# Patient Record
Sex: MALE | Race: WHITE | Employment: FULL TIME | URBAN - NONMETROPOLITAN AREA
[De-identification: names, ages, dates, MRNs, and addresses within clinical notes are randomized per-mention and may not be internally consistent; named-entity substitution may affect disease eponyms.]

---

## 2018-10-09 ENCOUNTER — HOSPITAL ENCOUNTER (OUTPATIENT)
Age: 53
Setting detail: OBSERVATION
Discharge: HOME | End: 2018-10-10
Attending: INTERNAL MEDICINE | Admitting: INTERNAL MEDICINE
Payer: COMMERCIAL

## 2018-10-09 ENCOUNTER — APPOINTMENT (OUTPATIENT)
Dept: GENERAL RADIOLOGY | Age: 53
End: 2018-10-09
Payer: COMMERCIAL

## 2018-10-09 DIAGNOSIS — R07.9 CHEST PAIN, UNSPECIFIED TYPE: ICD-10-CM

## 2018-10-09 DIAGNOSIS — Z72.0 TOBACCO ABUSE: ICD-10-CM

## 2018-10-09 DIAGNOSIS — R03.0 ELEVATED BLOOD PRESSURE READING: ICD-10-CM

## 2018-10-09 DIAGNOSIS — R94.31 EKG ABNORMALITIES: ICD-10-CM

## 2018-10-09 DIAGNOSIS — D69.6 THROMBOCYTOPENIA (HCC): ICD-10-CM

## 2018-10-09 DIAGNOSIS — R42 DIZZINESS: ICD-10-CM

## 2018-10-09 DIAGNOSIS — I20.9 ANGINA PECTORIS (HCC): Primary | ICD-10-CM

## 2018-10-09 PROBLEM — R07.89 OTHER CHEST PAIN: Status: ACTIVE | Noted: 2018-10-09

## 2018-10-09 LAB
ABSOLUTE EOS #: 0.19 K/UL (ref 0–0.4)
ABSOLUTE IMMATURE GRANULOCYTE: ABNORMAL K/UL (ref 0–0.3)
ABSOLUTE LYMPH #: 1.79 K/UL (ref 1–4.8)
ABSOLUTE MONO #: 0.38 K/UL (ref 0–1)
ALBUMIN SERPL-MCNC: 4.6 G/DL (ref 3.5–5.2)
ALBUMIN/GLOBULIN RATIO: ABNORMAL (ref 1–2.5)
ALP BLD-CCNC: 92 U/L (ref 40–129)
ALT SERPL-CCNC: 22 U/L (ref 5–41)
ANION GAP SERPL CALCULATED.3IONS-SCNC: 10 MMOL/L (ref 9–17)
AST SERPL-CCNC: 20 U/L
BASOPHILS # BLD: 1 % (ref 0–2)
BASOPHILS ABSOLUTE: 0.06 K/UL (ref 0–0.2)
BILIRUB SERPL-MCNC: 0.47 MG/DL (ref 0.3–1.2)
BNP INTERPRETATION: NORMAL
BUN BLDV-MCNC: 15 MG/DL (ref 6–20)
BUN/CREAT BLD: 17 (ref 9–20)
CALCIUM SERPL-MCNC: 9.5 MG/DL (ref 8.6–10.4)
CHLORIDE BLD-SCNC: 101 MMOL/L (ref 98–107)
CO2: 25 MMOL/L (ref 20–31)
CREAT SERPL-MCNC: 0.9 MG/DL (ref 0.7–1.2)
DIFFERENTIAL TYPE: ABNORMAL
EOSINOPHILS RELATIVE PERCENT: 3 % (ref 0–5)
GFR AFRICAN AMERICAN: >60 ML/MIN
GFR NON-AFRICAN AMERICAN: >60 ML/MIN
GFR SERPL CREATININE-BSD FRML MDRD: ABNORMAL ML/MIN/{1.73_M2}
GFR SERPL CREATININE-BSD FRML MDRD: ABNORMAL ML/MIN/{1.73_M2}
GLUCOSE BLD-MCNC: 105 MG/DL (ref 70–99)
HCT VFR BLD CALC: 41.3 % (ref 41–53)
HEMOGLOBIN: 14.7 G/DL (ref 13.5–17.5)
IMMATURE GRANULOCYTES: ABNORMAL %
LYMPHOCYTES # BLD: 28 % (ref 13–44)
MCH RBC QN AUTO: 31.1 PG (ref 26–34)
MCHC RBC AUTO-ENTMCNC: 35.6 G/DL (ref 31–37)
MCV RBC AUTO: 87.5 FL (ref 80–100)
MONOCYTES # BLD: 6 % (ref 5–9)
MORPHOLOGY: ABNORMAL
NRBC AUTOMATED: ABNORMAL PER 100 WBC
PDW BLD-RTO: 12.2 % (ref 12.1–15.2)
PLATELET # BLD: 92 K/UL (ref 140–450)
PLATELET ESTIMATE: ABNORMAL
PMV BLD AUTO: ABNORMAL FL
POTASSIUM SERPL-SCNC: 4 MMOL/L (ref 3.7–5.3)
PRO-BNP: <20 PG/ML
RBC # BLD: 4.72 M/UL (ref 4.5–5.9)
RBC # BLD: ABNORMAL 10*6/UL
SEG NEUTROPHILS: 62 % (ref 39–75)
SEGMENTED NEUTROPHILS ABSOLUTE COUNT: 3.98 K/UL (ref 2.1–6.5)
SODIUM BLD-SCNC: 136 MMOL/L (ref 135–144)
TOTAL PROTEIN: 7.5 G/DL (ref 6.4–8.3)
TROPONIN INTERP: NORMAL
TROPONIN T: <0.03 NG/ML
WBC # BLD: 6.4 K/UL (ref 3.5–11)
WBC # BLD: ABNORMAL 10*3/UL

## 2018-10-09 PROCEDURE — 83880 ASSAY OF NATRIURETIC PEPTIDE: CPT

## 2018-10-09 PROCEDURE — 93005 ELECTROCARDIOGRAM TRACING: CPT

## 2018-10-09 PROCEDURE — 2580000003 HC RX 258: Performed by: INTERNAL MEDICINE

## 2018-10-09 PROCEDURE — 6370000000 HC RX 637 (ALT 250 FOR IP): Performed by: INTERNAL MEDICINE

## 2018-10-09 PROCEDURE — 6360000002 HC RX W HCPCS: Performed by: INTERNAL MEDICINE

## 2018-10-09 PROCEDURE — 71045 X-RAY EXAM CHEST 1 VIEW: CPT

## 2018-10-09 PROCEDURE — G0378 HOSPITAL OBSERVATION PER HR: HCPCS

## 2018-10-09 PROCEDURE — 80053 COMPREHEN METABOLIC PANEL: CPT

## 2018-10-09 PROCEDURE — 36415 COLL VENOUS BLD VENIPUNCTURE: CPT

## 2018-10-09 PROCEDURE — 84484 ASSAY OF TROPONIN QUANT: CPT

## 2018-10-09 PROCEDURE — 99285 EMERGENCY DEPT VISIT HI MDM: CPT

## 2018-10-09 PROCEDURE — 99244 OFF/OP CNSLTJ NEW/EST MOD 40: CPT | Performed by: INTERNAL MEDICINE

## 2018-10-09 PROCEDURE — 96374 THER/PROPH/DIAG INJ IV PUSH: CPT

## 2018-10-09 PROCEDURE — 94761 N-INVAS EAR/PLS OXIMETRY MLT: CPT

## 2018-10-09 PROCEDURE — 96372 THER/PROPH/DIAG INJ SC/IM: CPT

## 2018-10-09 PROCEDURE — 85025 COMPLETE CBC W/AUTO DIFF WBC: CPT

## 2018-10-09 RX ORDER — OMEPRAZOLE 20 MG/1
20 CAPSULE, DELAYED RELEASE ORAL DAILY
COMMUNITY

## 2018-10-09 RX ORDER — ACETAMINOPHEN 325 MG/1
650 TABLET ORAL EVERY 4 HOURS PRN
Status: DISCONTINUED | OUTPATIENT
Start: 2018-10-09 | End: 2018-10-10 | Stop reason: HOSPADM

## 2018-10-09 RX ORDER — CITALOPRAM 10 MG/1
10 TABLET ORAL 2 TIMES DAILY
COMMUNITY

## 2018-10-09 RX ORDER — SODIUM CHLORIDE 0.9 % (FLUSH) 0.9 %
10 SYRINGE (ML) INJECTION EVERY 12 HOURS SCHEDULED
Status: DISCONTINUED | OUTPATIENT
Start: 2018-10-09 | End: 2018-10-10 | Stop reason: HOSPADM

## 2018-10-09 RX ORDER — LOSARTAN POTASSIUM 25 MG/1
25 TABLET ORAL 2 TIMES DAILY
COMMUNITY

## 2018-10-09 RX ORDER — ONDANSETRON 2 MG/ML
4 INJECTION INTRAMUSCULAR; INTRAVENOUS ONCE
Status: COMPLETED | OUTPATIENT
Start: 2018-10-09 | End: 2018-10-09

## 2018-10-09 RX ORDER — ASPIRIN 81 MG/1
324 TABLET, CHEWABLE ORAL ONCE
Status: COMPLETED | OUTPATIENT
Start: 2018-10-09 | End: 2018-10-09

## 2018-10-09 RX ORDER — ASPIRIN 81 MG/1
81 TABLET ORAL DAILY
Status: DISCONTINUED | OUTPATIENT
Start: 2018-10-09 | End: 2018-10-10 | Stop reason: HOSPADM

## 2018-10-09 RX ORDER — ATENOLOL 50 MG/1
50 TABLET ORAL 2 TIMES DAILY
Status: DISCONTINUED | OUTPATIENT
Start: 2018-10-09 | End: 2018-10-10 | Stop reason: HOSPADM

## 2018-10-09 RX ORDER — SODIUM CHLORIDE 0.9 % (FLUSH) 0.9 %
10 SYRINGE (ML) INJECTION PRN
Status: DISCONTINUED | OUTPATIENT
Start: 2018-10-09 | End: 2018-10-10 | Stop reason: HOSPADM

## 2018-10-09 RX ORDER — 0.9 % SODIUM CHLORIDE 0.9 %
500 INTRAVENOUS SOLUTION INTRAVENOUS ONCE
Status: COMPLETED | OUTPATIENT
Start: 2018-10-09 | End: 2018-10-09

## 2018-10-09 RX ORDER — PANTOPRAZOLE SODIUM 40 MG/1
40 TABLET, DELAYED RELEASE ORAL
Status: DISCONTINUED | OUTPATIENT
Start: 2018-10-10 | End: 2018-10-10 | Stop reason: HOSPADM

## 2018-10-09 RX ORDER — NITROGLYCERIN 0.4 MG/1
0.4 TABLET SUBLINGUAL EVERY 5 MIN PRN
Status: DISCONTINUED | OUTPATIENT
Start: 2018-10-09 | End: 2018-10-10 | Stop reason: HOSPADM

## 2018-10-09 RX ORDER — ONDANSETRON 2 MG/ML
4 INJECTION INTRAMUSCULAR; INTRAVENOUS EVERY 6 HOURS PRN
Status: DISCONTINUED | OUTPATIENT
Start: 2018-10-09 | End: 2018-10-10 | Stop reason: HOSPADM

## 2018-10-09 RX ORDER — LOSARTAN POTASSIUM 50 MG/1
25 TABLET ORAL 2 TIMES DAILY
Status: DISCONTINUED | OUTPATIENT
Start: 2018-10-09 | End: 2018-10-10 | Stop reason: HOSPADM

## 2018-10-09 RX ORDER — M-VIT,TX,IRON,MINS/CALC/FOLIC 27MG-0.4MG
1 TABLET ORAL DAILY
Status: DISCONTINUED | OUTPATIENT
Start: 2018-10-09 | End: 2018-10-10 | Stop reason: HOSPADM

## 2018-10-09 RX ORDER — CITALOPRAM 20 MG/1
10 TABLET ORAL 2 TIMES DAILY
Status: DISCONTINUED | OUTPATIENT
Start: 2018-10-09 | End: 2018-10-10 | Stop reason: HOSPADM

## 2018-10-09 RX ORDER — M-VIT,TX,IRON,MINS/CALC/FOLIC 27MG-0.4MG
1 TABLET ORAL DAILY
COMMUNITY

## 2018-10-09 RX ORDER — 0.9 % SODIUM CHLORIDE 0.9 %
1000 INTRAVENOUS SOLUTION INTRAVENOUS ONCE
Status: COMPLETED | OUTPATIENT
Start: 2018-10-09 | End: 2018-10-09

## 2018-10-09 RX ORDER — ATENOLOL 50 MG/1
50 TABLET ORAL 2 TIMES DAILY
COMMUNITY

## 2018-10-09 RX ADMIN — Medication 10 ML: at 19:54

## 2018-10-09 RX ADMIN — SODIUM CHLORIDE 500 ML: 9 INJECTION, SOLUTION INTRAVENOUS at 13:57

## 2018-10-09 RX ADMIN — ATENOLOL 50 MG: 50 TABLET ORAL at 19:55

## 2018-10-09 RX ADMIN — LOSARTAN POTASSIUM 25 MG: 50 TABLET ORAL at 19:54

## 2018-10-09 RX ADMIN — SODIUM CHLORIDE 1000 ML: 9 INJECTION, SOLUTION INTRAVENOUS at 17:23

## 2018-10-09 RX ADMIN — ENOXAPARIN SODIUM 40 MG: 40 INJECTION SUBCUTANEOUS at 17:30

## 2018-10-09 RX ADMIN — ONDANSETRON 4 MG: 2 INJECTION INTRAMUSCULAR; INTRAVENOUS at 13:57

## 2018-10-09 RX ADMIN — ASPIRIN 81 MG 324 MG: 81 TABLET ORAL at 13:57

## 2018-10-09 RX ADMIN — MULTIPLE VITAMINS W/ MINERALS TAB 1 TABLET: TAB at 17:30

## 2018-10-09 RX ADMIN — CITALOPRAM HYDROBROMIDE 10 MG: 20 TABLET ORAL at 19:55

## 2018-10-09 NOTE — ED NOTES
Pt states he would like to leave hospital. Is concerned about the bill that he is going to receive. Speaking to physician about current concerns. Pt states he now feels better. Pt states he will sign AMA form if he decides he would like to leave.       Jessica De Leon RN  10/09/18 8813

## 2018-10-09 NOTE — ED PROVIDER NOTES
with cardiologist Dr. Dillon Covarrubias who I had reviewed the EKG and agrees that there is early repolarization in I, II  [MS]    Patient stood up and feels dizzy. He will stay  [MS]   53-69-10-18 the hospitalist for admission. [MS]   7848 I spoke with Dr. Ortiz Gonzales who accepted this patient for observation for chest pain evaluation. [MS]      ED Course User Index  [MS] Natacha Perez MD       CONSULTS:  IP CONSULT TO CARDIOLOGY    PROCEDURES:  Unless otherwise noted below, none     Procedures      Summation    Middle aged male who presented with unstable angina, now chest pain-free. However symptomatic again when he stood up. Admitted to observation for further evaluation and management. Patient Course:   ED Course as of Oct 10 1134   Tue Oct 09, 2018   1430 Patient wants to leave. [MS]   [phone] Call placed to cardiologist for consult  [MS]   1440 I spoke with cardiologist Dr. Dillon Covarrubias who I had reviewed the EKG and agrees that there is early repolarization in I, II  [MS]    Patient stood up and feels dizzy. He will stay  [MS]   53-69-10-18 the hospitalist for admission. [MS]   1827 I spoke with Dr. Ortiz Gonzales who accepted this patient for observation for chest pain evaluation.   [MS]      ED Course User Index  [MS] Natacha Perez MD         ED Medications administered this visit:    Medications   aspirin EC tablet 81 mg (81 mg Oral Given 10/10/18 0953)   atenolol (TENORMIN) tablet 50 mg (50 mg Oral Given 10/10/18 0953)   citalopram (CELEXA) tablet 10 mg (10 mg Oral Given 10/10/18 0953)   losartan (COZAAR) tablet 25 mg (25 mg Oral Given 10/10/18 0953)   therapeutic multivitamin-minerals 1 tablet (1 tablet Oral Given 10/10/18 0953)   pantoprazole (PROTONIX) tablet 40 mg (40 mg Oral Given 10/10/18 0616)   sodium chloride flush 0.9 % injection 10 mL (10 mLs Intravenous Given 10/10/18 0953)   sodium chloride flush 0.9 % injection 10 mL (not administered)   acetaminophen (TYLENOL) tablet 650 mg (not

## 2018-10-10 VITALS
HEIGHT: 69 IN | RESPIRATION RATE: 18 BRPM | BODY MASS INDEX: 34.07 KG/M2 | HEART RATE: 67 BPM | WEIGHT: 230 LBS | TEMPERATURE: 98.4 F | SYSTOLIC BLOOD PRESSURE: 131 MMHG | OXYGEN SATURATION: 96 % | DIASTOLIC BLOOD PRESSURE: 79 MMHG

## 2018-10-10 LAB
CHOLESTEROL/HDL RATIO: 6.7
CHOLESTEROL: 181 MG/DL
HCT VFR BLD CALC: 41.4 % (ref 41–53)
HDLC SERPL-MCNC: 27 MG/DL
HEMOGLOBIN: 14.1 G/DL (ref 13.5–17.5)
LDL CHOLESTEROL: 100 MG/DL (ref 0–130)
LV EF: 60 %
LVEF MODALITY: NORMAL
MCH RBC QN AUTO: 30.9 PG (ref 26–34)
MCHC RBC AUTO-ENTMCNC: 34.1 G/DL (ref 31–37)
MCV RBC AUTO: 90.6 FL (ref 80–100)
MORPHOLOGY: NORMAL
NRBC AUTOMATED: ABNORMAL PER 100 WBC
PDW BLD-RTO: 13.2 % (ref 12.1–15.2)
PLATELET # BLD: 82 K/UL (ref 140–450)
PMV BLD AUTO: ABNORMAL FL (ref 6–12)
RBC # BLD: 4.57 M/UL (ref 4.5–5.9)
TRIGL SERPL-MCNC: 269 MG/DL
VLDLC SERPL CALC-MCNC: ABNORMAL MG/DL (ref 1–30)
WBC # BLD: 6 K/UL (ref 3.5–11)

## 2018-10-10 PROCEDURE — 94761 N-INVAS EAR/PLS OXIMETRY MLT: CPT

## 2018-10-10 PROCEDURE — 36415 COLL VENOUS BLD VENIPUNCTURE: CPT

## 2018-10-10 PROCEDURE — 96372 THER/PROPH/DIAG INJ SC/IM: CPT

## 2018-10-10 PROCEDURE — 2580000003 HC RX 258: Performed by: INTERNAL MEDICINE

## 2018-10-10 PROCEDURE — G0378 HOSPITAL OBSERVATION PER HR: HCPCS

## 2018-10-10 PROCEDURE — 80061 LIPID PANEL: CPT

## 2018-10-10 PROCEDURE — 6370000000 HC RX 637 (ALT 250 FOR IP): Performed by: INTERNAL MEDICINE

## 2018-10-10 PROCEDURE — 6360000002 HC RX W HCPCS: Performed by: INTERNAL MEDICINE

## 2018-10-10 PROCEDURE — 99212 OFFICE O/P EST SF 10 MIN: CPT | Performed by: INTERNAL MEDICINE

## 2018-10-10 PROCEDURE — 85027 COMPLETE CBC AUTOMATED: CPT

## 2018-10-10 PROCEDURE — 93005 ELECTROCARDIOGRAM TRACING: CPT

## 2018-10-10 PROCEDURE — 93306 TTE W/DOPPLER COMPLETE: CPT

## 2018-10-10 RX ADMIN — ENOXAPARIN SODIUM 40 MG: 40 INJECTION SUBCUTANEOUS at 09:53

## 2018-10-10 RX ADMIN — ASPIRIN 81 MG: 81 TABLET, COATED ORAL at 09:53

## 2018-10-10 RX ADMIN — LOSARTAN POTASSIUM 25 MG: 50 TABLET ORAL at 09:53

## 2018-10-10 RX ADMIN — Medication 10 ML: at 09:53

## 2018-10-10 RX ADMIN — MULTIPLE VITAMINS W/ MINERALS TAB 1 TABLET: TAB at 09:53

## 2018-10-10 RX ADMIN — ATENOLOL 50 MG: 50 TABLET ORAL at 09:53

## 2018-10-10 RX ADMIN — PANTOPRAZOLE SODIUM 40 MG: 40 TABLET, DELAYED RELEASE ORAL at 06:16

## 2018-10-10 RX ADMIN — CITALOPRAM HYDROBROMIDE 10 MG: 20 TABLET ORAL at 09:53

## 2018-10-10 NOTE — PROGRESS NOTES
Discharge instructions discussed with patient and spouse. Deny questions. To have stress completed tomorrow as outpatient. Belongings sent with patient. Wheeled to front entrance and leaves in private vehicle with spouse.

## 2018-10-10 NOTE — H&P
History & Physical    Patient:  Norma Childs  YOB: 1965  Date of Service: 10/10/2018  MRN: [id number]   Acct:   [de-identified]   Primary Care Physician: Rafa Gonazlez MD    Chief Complaint:   Chief Complaint   Patient presents with    Dizziness     Sitting at work and became dizzy with nausea has chest heaviness.  Chest Pain     Chest heaviness with L arm feeling strange. History of Present Illness: The patient is a 48 y.o. male presented to the emergency room by squad complaining of dizziness, chest heaviness, nausea. The symptoms started at workplace at rest, shortly after he had lunch. Initially he became very lightheaded and \" disoriented\", he started sweating and feeling nauseous. Then he noticed having tightness, heaviness in his chest, mainly midsternal area. He believes he had some heaviness in the left arm. He rates chest discomfort as 5-6 out of 10 in intensity, continuous. His coworkers called EMS and the patient was transferred to the emergency room for evaluation. The patient's vitals are stable. EKG was nonacute, rate 62, slight ST segment elevation of less than 1 mm in the lateral leads believed to be early repolarization. Troponin was normal, BMP, CBC unremarkable, chest x-ray revealed no acute cardiopulmonary abnormalities. The patient was treated with aspirin, he felt improved in the emergency room and initially was going to leave. When he got up to go home he felt lightheaded again and decided to stay for observation. This morning Meli Lopez has no complaints, repeat troponin levels normal, he has been chest pain-free since admission, repeat EKG still unremarkable with no acute findings. Meli Lopez reports no history of coronary artery disease. He is a smoker, has hypertension, family history of heart disease in both parents.       Past Medical History:        Diagnosis Date    GERD (gastroesophageal reflux disease)     Hyperlipidemia     Hypertension rhonchi, normal air movement  Abdomen: soft, non-tender, non-distended, normal bowel sounds, no masses   Extremities: no cyanosis, clubbing or edema  Skin: warm and dry, no rash or erythema  Head: normocephalic and atraumatic, oral mucosa moist, pharynx without hyperemia  Eyes: pupils equal, round, and reactive to light  Neck: supple and non-tender,no thyromegaly   Musculoskeletal: normal range of motion, no joint swelling, deformity or tenderness  Neurological: alert, oriented, normal speech, no focal findings or movement disorder noted    Review of Labs and Diagnostic Testing:    Recent Results (from the past 24 hour(s))   Comprehensive Metabolic Panel w/ Reflex to MG    Collection Time: 10/09/18  1:45 PM   Result Value Ref Range    Glucose 105 (H) 70 - 99 mg/dL    BUN 15 6 - 20 mg/dL    CREATININE 0.90 0.70 - 1.20 mg/dL    Bun/Cre Ratio 17 9 - 20    Calcium 9.5 8.6 - 10.4 mg/dL    Sodium 136 135 - 144 mmol/L    Potassium 4.0 3.7 - 5.3 mmol/L    Chloride 101 98 - 107 mmol/L    CO2 25 20 - 31 mmol/L    Anion Gap 10 9 - 17 mmol/L    Alkaline Phosphatase 92 40 - 129 U/L    ALT 22 5 - 41 U/L    AST 20 <40 U/L    Total Bilirubin 0.47 0.30 - 1.20 mg/dL    Total Protein 7.5 6.4 - 8.3 g/dL    Alb 4.6 3.5 - 5.2 g/dL    Albumin/Globulin Ratio NOT REPORTED 1.0 - 2.5    GFR Non-African American >60 >60 mL/min    GFR African American >60 >60 mL/min    GFR Comment          GFR Staging NOT REPORTED    Brain Natriuretic Peptide    Collection Time: 10/09/18  1:45 PM   Result Value Ref Range    Pro-BNP <20 <300 pg/mL    BNP Interpretation         Troponin    Collection Time: 10/09/18  1:45 PM   Result Value Ref Range    Troponin T <0.03 <0.03 ng/mL    Troponin Interp         CBC auto differential    Collection Time: 10/09/18  1:45 PM   Result Value Ref Range    WBC 6.4 3.5 - 11.0 k/uL    RBC 4.72 4.5 - 5.9 m/uL    Hemoglobin 14.7 13.5 - 17.5 g/dL    Hematocrit 41.3 41 - 53 %    MCV 87.5 80 - 100 fL    MCH 31.1 26 - 34 pg    MCHC Ref Range    WBC 6.0 3.5 - 11.0 k/uL    RBC 4.57 4.5 - 5.9 m/uL    Hemoglobin 14.1 13.5 - 17.5 g/dL    Hematocrit 41.4 41 - 53 %    MCV 90.6 80 - 100 fL    MCH 30.9 26 - 34 pg    MCHC 34.1 31 - 37 g/dL    RDW 13.2 12.1 - 15.2 %    Platelets 82 (L) 017 - 450 k/uL    MPV NOT REPORTED 6.0 - 12.0 fL    NRBC Automated NOT REPORTED per 100 WBC   Lipid Panel    Collection Time: 10/10/18  6:07 AM   Result Value Ref Range    Cholesterol 181 <200 mg/dL    HDL 27 (L) >40 mg/dL    LDL Cholesterol 100 0 - 130 mg/dL    Chol/HDL Ratio 6.7 (H) <5    Triglycerides 269 (H) <150 mg/dL    VLDL NOT REPORTED 1 - 30 mg/dL   MORPHOLOGY CHECK    Collection Time: 10/10/18  6:07 AM   Result Value Ref Range    Morphology DECREASED PLATELETS        Radiology:     Xr Chest Portable    Result Date: 10/9/2018  AP PORTABLE CHEST 1359 HOURS: COMPARISON: 5/4/2013 CT chest, abdomen and pelvis. HISTORY: Chest pain. FINDINGS: Heart size normal. Lungs clear. Bony thorax and upper abdomen normal.     Negative chest.         Assessment/ Plan:    1. Chest pain, dizziness and diaphoresis - the patient was admitted for observation, cardiac workup is negative for evidence of acute coronary syndrome. The patient is asymptomatic this morning. 2-D echo shows normal LV function, EF 60%, no significant valvular abnormality, PAP mildly elevated possibly due to sleep apnea. We'll discharge the patient home, will order stress test for tomorrow  2. Hypertension - continue home medications  3. Hyperlipidemia - the patient most likely will need to be on statin, he wants to discuss with his family doctor  4. Suspected obstructive sleep apnea - Patrice Gustafson strongly advised to follow-up with PCP and arrange outpatient sleep studies  5. GERD  6. History of sarcoidosis      Medical Necessity: Inpatient admission is appropriate for this patient secondary to the need of      Estimated length of stay: 1 days.     The beneficiary may reasonably be expected to be discharged or

## 2018-10-10 NOTE — PROGRESS NOTES
Cardiology    Echo is essentially normal with EF 60%. Mild elevation of pulmonary pressure at 35 mmHg, and may represent sleep apnea. No significant valvular abnormality noted.     Ade Shah MD

## 2018-10-10 NOTE — CONSULTS
Calcium was 9.5. Troponin was less than 0.03. His ALT was 22, AST was 20. White count  6.4, hemoglobin 14.7 with a platelet count of 60,124. EKG showed sinus rhythm with what appeared to be early repolarization  with mild ST elevation in 1 and 2, aVL, and V4 through V6, was concave  upward. This was more consistent with either pericarditis or early  repolarization. Chest x-ray was unremarkable. IMPRESSION:  1. Onset of chest pain with diaphoresis with some nausea and  lightheadedness today starting at 1 o'clock, which resolved by the  time he got into the emergency room, recurred briefly and then  resolved again, cannot rule out angina. 2.  No acute changes on EKG, more looks like early repolarization or  perhaps pericarditis, although symptoms do not really suggest  pericarditis. 3.  Hypertension. 4.  Smoking history of one pack per day. 5.  Family history of coronary artery disease. 6.  Hyperlipidemia. 7.  History of sarcoidosis treated with prednisone, found because of  hypercalcemia, which has totally resolved. 8.  History of alcoholic cirrhosis with him stopping drugs and alcohol  10 years ago. PLAN:  1.  Rule out myocardial infarction with serial enzymes and EKGs. 2.  Echocardiogram tomorrow. 3.  If he remains pain-free, would discharge tomorrow but would do a  exercise Myoview stress test on Thursday. 4.  Depending on the results of the stress test and his clinical  course, we will plan on further recommendations. DISCUSSION:  The patient has had no symptoms of a cardiac disease  leading up to today. He has been totally unlimited in his activity  and has had no chest pain, shortness of breath or loss of energy. He does, however, have multiple risk factors for CAD including family  history, hypertension, hyperlipidemia and a smoking history. He had rather sudden onset of chest pain with marked diaphoresis. He  did have, however, some nausea with some dizziness.   This lasted for  approximately a half hour before he came to the emergency room. By  the time he arrived, his pain has almost entirely resolved. His EKG shows mild ST elevation rather diffusely through his EKG with  no localization and therefore, I do not think this represents  ischemia. There could be some similarities on EKG consistent with  pericarditis; however, his symptoms do not suggest pericarditis. I  believe his early repolarization is the most likely cause, although we  will get serial EKGs to see if there is any change in the morphology. At this point, his enzymes are negative and there is no evidence of an  acute coronary artery syndrome. He is pain-free as I see him here on  the medical floor. We will watch him overnight with serial EKG and enzymes. We will do  an echocardiogram in the morning to look at LV size and function and  increase his activity. If he is pain-free and doing well, he could be  discharged tomorrow. I will not be here tomorrow and we do not do  stress tests on Wednesdays and therefore, I would recommend a stress  test on Thursday as an outpatient if he should go home tomorrow. Further recommendations would depend upon his clinical course. If he  remains free and stress test is unremarkable, then I would watch. He  does have a colonoscopy planned to be done by Dr. Sam Hood in the near  future. If he has recurrent pain or if his stress test was abnormal,  then we would need to consider further workup including possible  invasive procedure such as cardiac catheterization. Risk modification will be important. We will review his lipid profile  tomorrow. I suspect he is not on a statin because of his history of  cirrhosis, although his liver enzymes are normal and if he does have  marked hyperlipidemia, I would tend to treat especially with the risk  that he has coronary artery disease. Thank you very much for allowing me the privilege of seeing the  patient.   If

## 2018-10-11 ENCOUNTER — HOSPITAL ENCOUNTER (OUTPATIENT)
Dept: NUCLEAR MEDICINE | Age: 53
Discharge: HOME | End: 2018-10-13
Payer: COMMERCIAL

## 2018-10-11 ENCOUNTER — HOSPITAL ENCOUNTER (OUTPATIENT)
Dept: NON INVASIVE DIAGNOSTICS | Age: 53
Discharge: HOME | End: 2018-10-11
Payer: COMMERCIAL

## 2018-10-11 VITALS — DIASTOLIC BLOOD PRESSURE: 75 MMHG | SYSTOLIC BLOOD PRESSURE: 123 MMHG | HEART RATE: 72 BPM

## 2018-10-11 LAB
EKG ATRIAL RATE: 62 BPM
EKG P AXIS: 26 DEGREES
EKG P-R INTERVAL: 176 MS
EKG Q-T INTERVAL: 406 MS
EKG QRS DURATION: 84 MS
EKG QTC CALCULATION (BAZETT): 412 MS
EKG R AXIS: 28 DEGREES
EKG T AXIS: 31 DEGREES
EKG VENTRICULAR RATE: 62 BPM

## 2018-10-11 PROCEDURE — 2580000003 HC RX 258: Performed by: INTERNAL MEDICINE

## 2018-10-11 PROCEDURE — 78452 HT MUSCLE IMAGE SPECT MULT: CPT

## 2018-10-11 PROCEDURE — A9500 TC99M SESTAMIBI: HCPCS | Performed by: INTERNAL MEDICINE

## 2018-10-11 PROCEDURE — 6360000002 HC RX W HCPCS: Performed by: INTERNAL MEDICINE

## 2018-10-11 PROCEDURE — 93017 CV STRESS TEST TRACING ONLY: CPT

## 2018-10-11 PROCEDURE — 3430000000 HC RX DIAGNOSTIC RADIOPHARMACEUTICAL: Performed by: INTERNAL MEDICINE

## 2018-10-11 RX ORDER — AMINOPHYLLINE DIHYDRATE 25 MG/ML
75 INJECTION, SOLUTION INTRAVENOUS
Status: ACTIVE | OUTPATIENT
Start: 2018-10-11 | End: 2018-10-11

## 2018-10-11 RX ORDER — 0.9 % SODIUM CHLORIDE 0.9 %
10 VIAL (ML) INJECTION PRN
Status: DISCONTINUED | OUTPATIENT
Start: 2018-10-11 | End: 2018-10-12 | Stop reason: HOSPADM

## 2018-10-11 RX ADMIN — REGADENOSON 0.4 MG: 0.08 INJECTION, SOLUTION INTRAVENOUS at 08:58

## 2018-10-11 RX ADMIN — TETRAKIS(2-METHOXYISOBUTYLISOCYANIDE)COPPER(I) TETRAFLUOROBORATE 32.4 MILLICURIE: 1 INJECTION, POWDER, LYOPHILIZED, FOR SOLUTION INTRAVENOUS at 08:58

## 2018-10-11 RX ADMIN — TETRAKIS(2-METHOXYISOBUTYLISOCYANIDE)COPPER(I) TETRAFLUOROBORATE 10.6 MILLICURIE: 1 INJECTION, POWDER, LYOPHILIZED, FOR SOLUTION INTRAVENOUS at 08:00

## 2018-10-11 RX ADMIN — Medication 10 ML: at 08:58

## 2018-10-12 ENCOUNTER — TELEPHONE (OUTPATIENT)
Dept: CARDIOLOGY CLINIC | Age: 53
End: 2018-10-12

## 2018-10-12 DIAGNOSIS — R07.89 OTHER CHEST PAIN: Primary | ICD-10-CM

## 2018-10-12 DIAGNOSIS — E55.9 VITAMIN D DEFICIENCY: ICD-10-CM

## 2018-10-12 DIAGNOSIS — I10 ESSENTIAL HYPERTENSION: ICD-10-CM

## 2018-10-12 DIAGNOSIS — R42 LIGHTHEADEDNESS: ICD-10-CM

## 2018-10-12 DIAGNOSIS — Z13.220 SCREENING FOR HYPERLIPIDEMIA: ICD-10-CM

## 2018-10-12 DIAGNOSIS — R42 DIZZINESS: ICD-10-CM

## 2018-10-12 LAB
EKG ATRIAL RATE: 60 BPM
EKG ATRIAL RATE: 61 BPM
EKG P AXIS: 40 DEGREES
EKG P AXIS: 66 DEGREES
EKG P-R INTERVAL: 182 MS
EKG P-R INTERVAL: 200 MS
EKG Q-T INTERVAL: 390 MS
EKG Q-T INTERVAL: 396 MS
EKG QRS DURATION: 78 MS
EKG QRS DURATION: 84 MS
EKG QTC CALCULATION (BAZETT): 390 MS
EKG QTC CALCULATION (BAZETT): 398 MS
EKG R AXIS: 37 DEGREES
EKG R AXIS: 41 DEGREES
EKG T AXIS: 40 DEGREES
EKG T AXIS: 44 DEGREES
EKG VENTRICULAR RATE: 60 BPM
EKG VENTRICULAR RATE: 61 BPM